# Patient Record
(demographics unavailable — no encounter records)

---

## 2024-12-20 NOTE — ASSESSMENT
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  Lung and liver metastases.  At this time patient has started chemotherapy. Recommend continuing chemotherapy.  If fevers develop or increased abdominal pain I have discussed with the patient the role for fecal diversion-ostomy creation.  The risks and befits of the treatment plan were reviewed and outlined with the patient. The patient understands the associated risks of the involved treatment . All questions were answered and explained.

## 2024-12-20 NOTE — HISTORY OF PRESENT ILLNESS
[FreeTextEntry1] : 37 y/o M presents for initial consultation after recent visit to ED.   PMH: Metastatic Colon Cancer PSH: Denies  FH: Denies CRC/IBD Meds: Antibiotics Allergies: NKDA Former smoker   Patient presented to [facility] ED 12/11 for "Foamy urine". Recently diagnosed with dx'd w/metastatic colon ca (had CT, PET scans, colonoscopy -- in various non [facility] hospitals, has seen multiple oncologists -scheduled for port placement and chemo the following week.   CT A/P noted Large sigmoid mass compatible with patient's known neoplasm with focal perforation and extension into the posterior urinary bladder wall. There is resultant mild left hydroureteronephrosis. Air in the bladder lumen which may be due to fistulization of the colon mass versus recent instrumentation. Hepatic lesions concerning for metastases. Left lower lobe pulmonary nodule concerning for metastasis.  Labs during hospitalization WBC 11.88, HGB 13.0, HCT 39.3, Hgb 13, AST 12, ALT 7, UA was abnormal, but urine culture was negative.  Given IV Zosyn in hospital and discharged on Augmentin x10 days.    Patient presents today for f/u currently on day 8/10 of antibiotics continues with pain and bleeding with urination. Also c/o passing stool and gas through urethra.   Patient is receiving care w/ Dr. Ferrara  Oncologist, started first week of chemo with Dr. Wang.  Taking Percocet 5mg for pain given by Onc.

## 2024-12-20 NOTE — PHYSICAL EXAM
[Abdomen Masses] : No abdominal masses [Abdomen Tenderness] : ~T No ~M abdominal tenderness [No HSM] : no hepatosplenomegaly [JVD] : no jugular venous distention  [Normal Breath Sounds] : Normal breath sounds [Normal Heart Sounds] : normal heart sounds [No Rash or Lesion] : No rash or lesion [Alert] : alert [Calm] : calm

## 2024-12-20 NOTE — HISTORY OF PRESENT ILLNESS
[FreeTextEntry1] : 37 y/o M presents for initial consultation after recent visit to ED.   PMH: Metastatic Colon Cancer PSH: Denies  FH: Denies CRC/IBD Meds: Antibiotics Allergies: NKDA Former smoker   Patient presented to [facility] ED 12/11 for "Foamy urine". Recently diagnosed with dx'd w/metastatic colon ca (had CT, PET scans, colonoscopy -- in various non [facility] hospitals, has seen multiple oncologists -scheduled for port placement and chemo the following week.   CT A/P noted Large sigmoid mass compatible with patient's known neoplasm with focal perforation and extension into the posterior urinary bladder wall. There is resultant mild left hydroureteronephrosis. Air in the bladder lumen which may be due to fistulization of the colon mass versus recent instrumentation. Hepatic lesions concerning for metastases. Left lower lobe pulmonary nodule concerning for metastasis.  Labs during hospitalization WBC 11.88, HGB 13.0, HCT 39.3, Hgb 13, AST 12, ALT 7, UA was abnormal, but urine culture was negative.  Given IV Zosyn in hospital and discharged on Augmentin x10 days.    Patient presents today for f/u currently on day 8/10 of antibiotics continues with pain and bleeding with urination. Also c/o passing stool and gas through urethra.   Patient is receiving care w/ Dr. Ferrara  Oncologist, started first week of chemo with Dr. Wang.  Taking Percocet 5mg for pain given by Onc.